# Patient Record
Sex: MALE | Race: WHITE | Employment: UNEMPLOYED | URBAN - METROPOLITAN AREA
[De-identification: names, ages, dates, MRNs, and addresses within clinical notes are randomized per-mention and may not be internally consistent; named-entity substitution may affect disease eponyms.]

---
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
## 2017-02-06 ENCOUNTER — TRANSFERRED RECORDS (OUTPATIENT)
Dept: HEALTH INFORMATION MANAGEMENT | Facility: CLINIC | Age: 22
End: 2017-02-06

## 2017-03-08 ENCOUNTER — TRANSFERRED RECORDS (OUTPATIENT)
Dept: HEALTH INFORMATION MANAGEMENT | Facility: CLINIC | Age: 22
End: 2017-03-08

## 2017-03-08 LAB
ALT SERPL-CCNC: 33 U/L (ref 12–78)
AST SERPL-CCNC: 18 U/L (ref 15–37)
CREAT SERPL-MCNC: 0.83 MG/DL (ref 0.7–1.3)
GFR SERPL CREATININE-BSD FRML MDRD: >90 ML/MIN/1.73M2
GLUCOSE SERPL-MCNC: 100 MG/DL (ref 70–100)
POTASSIUM SERPL-SCNC: 3.6 MEQ/L (ref 3.5–5.1)
TSH SERPL-ACNC: 0.63 UIU/ML (ref 0.36–3.74)

## 2017-06-26 ENCOUNTER — COMMUNICATION - HEALTHEAST (OUTPATIENT)
Dept: BEHAVIORAL HEALTH | Facility: CLINIC | Age: 22
End: 2017-06-26

## 2017-06-26 DIAGNOSIS — F11.20 OPIOID USE DISORDER, MODERATE, DEPENDENCE (H): ICD-10-CM

## 2017-08-04 ENCOUNTER — COMMUNICATION - HEALTHEAST (OUTPATIENT)
Dept: BEHAVIORAL HEALTH | Facility: CLINIC | Age: 22
End: 2017-08-04

## 2017-08-04 DIAGNOSIS — F90.2 ATTENTION DEFICIT HYPERACTIVITY DISORDER (ADHD), COMBINED TYPE: ICD-10-CM

## 2017-08-21 ENCOUNTER — TRANSFERRED RECORDS (OUTPATIENT)
Dept: HEALTH INFORMATION MANAGEMENT | Facility: CLINIC | Age: 22
End: 2017-08-21

## 2017-08-21 LAB
ALT SERPL-CCNC: 25 U/L (ref 12–78)
AST SERPL-CCNC: 15 U/L (ref 15–37)
CHOLEST SERPL-MCNC: 149 MG/DL (ref 0–200)
GLUCOSE SERPL-MCNC: 89 MG/DL (ref 70–99)
HDLC SERPL-MCNC: 43 MG/DL (ref 40–60)
LDLC SERPL CALC-MCNC: 90 MG/DL (ref 0–129)
TRIGL SERPL-MCNC: 79 MG/DL (ref 30–150)

## 2018-07-03 LAB — PHQ9 SCORE: 3

## 2019-03-06 LAB — PHQ9 SCORE: 2

## 2019-07-10 ENCOUNTER — TRANSFERRED RECORDS (OUTPATIENT)
Dept: HEALTH INFORMATION MANAGEMENT | Facility: CLINIC | Age: 24
End: 2019-07-10

## 2020-01-19 ENCOUNTER — OFFICE VISIT (OUTPATIENT)
Dept: URGENT CARE | Facility: URGENT CARE | Age: 25
End: 2020-01-19
Payer: COMMERCIAL

## 2020-01-19 VITALS
RESPIRATION RATE: 12 BRPM | HEART RATE: 86 BPM | HEIGHT: 72 IN | TEMPERATURE: 98.3 F | BODY MASS INDEX: 21.4 KG/M2 | OXYGEN SATURATION: 99 % | SYSTOLIC BLOOD PRESSURE: 110 MMHG | DIASTOLIC BLOOD PRESSURE: 68 MMHG | WEIGHT: 158 LBS

## 2020-01-19 DIAGNOSIS — R20.2 PARESTHESIA: Primary | ICD-10-CM

## 2020-01-19 DIAGNOSIS — M79.10 MYALGIA: ICD-10-CM

## 2020-01-19 LAB
ERYTHROCYTE [DISTWIDTH] IN BLOOD BY AUTOMATED COUNT: 13.6 % (ref 10–15)
HCT VFR BLD AUTO: 49.2 % (ref 40–53)
HGB BLD-MCNC: 16 G/DL (ref 13.3–17.7)
MCH RBC QN AUTO: 29.6 PG (ref 26.5–33)
MCHC RBC AUTO-ENTMCNC: 32.5 G/DL (ref 31.5–36.5)
MCV RBC AUTO: 91 FL (ref 78–100)
PLATELET # BLD AUTO: 265 10E9/L (ref 150–450)
RBC # BLD AUTO: 5.41 10E12/L (ref 4.4–5.9)
WBC # BLD AUTO: 8.4 10E9/L (ref 4–11)

## 2020-01-19 PROCEDURE — 85027 COMPLETE CBC AUTOMATED: CPT | Performed by: FAMILY MEDICINE

## 2020-01-19 PROCEDURE — 80053 COMPREHEN METABOLIC PANEL: CPT | Performed by: FAMILY MEDICINE

## 2020-01-19 PROCEDURE — 36415 COLL VENOUS BLD VENIPUNCTURE: CPT | Performed by: FAMILY MEDICINE

## 2020-01-19 PROCEDURE — 84443 ASSAY THYROID STIM HORMONE: CPT | Performed by: FAMILY MEDICINE

## 2020-01-19 PROCEDURE — 99203 OFFICE O/P NEW LOW 30 MIN: CPT | Performed by: FAMILY MEDICINE

## 2020-01-19 PROCEDURE — 83735 ASSAY OF MAGNESIUM: CPT | Performed by: FAMILY MEDICINE

## 2020-01-19 PROCEDURE — 82306 VITAMIN D 25 HYDROXY: CPT | Performed by: FAMILY MEDICINE

## 2020-01-19 RX ORDER — RISPERIDONE 1 MG/1
1 TABLET ORAL
COMMUNITY
Start: 2019-12-29 | End: 2020-02-18

## 2020-01-19 RX ORDER — AMOXICILLIN 875 MG
TABLET ORAL
COMMUNITY
Start: 2019-04-25 | End: 2020-01-19

## 2020-01-19 RX ORDER — NALTREXONE HYDROCHLORIDE 50 MG/1
TABLET, FILM COATED ORAL
COMMUNITY
Start: 2019-03-06 | End: 2020-02-18

## 2020-01-19 RX ORDER — IBUPROFEN 200 MG
800 TABLET ORAL EVERY 4 HOURS PRN
COMMUNITY
End: 2020-09-14

## 2020-01-19 ASSESSMENT — PAIN SCALES - GENERAL: PAINLEVEL: MODERATE PAIN (5)

## 2020-01-19 ASSESSMENT — MIFFLIN-ST. JEOR: SCORE: 1754.68

## 2020-01-19 NOTE — PATIENT INSTRUCTIONS
"Okay to take ibuprofen 200 mg - 4 tablets (800 mg) every 8 hours as needed.  Okay to take tylenol 500 mg - 2 tablets (1000 mg) every 6-8 hours as needed, do not exceed 3000 mg in 24 hours.    Patient Education     Paraesthesias  Paraesthesia is a burning or prickling sensation that is sometimes felt in the hands, arms, legs or feet. It can also occur in other parts of the body. It can also feel like tingling or numbness, skin crawling, or itching. The feeling is not comfortable, but it is not painful. (The \"pins and needles\" feeling that happens when a foot or hand \"falls asleep\" is a temporary paraesthesia.)  Paraesthesias that last or come and go may be caused by medical issues that need to be treated. These include stroke, a bulging disk pressing on a nerve, a trapped nerve, vitamin deficiencies, uncontrolled diabetes, alcohol abuse, or even certain medicines.  Tests are often done. These tests may include blood tests, X-ray, CT (computerized tomography) scan, nerve conduction studies (NCS), or a muscle test (electromyography). Depending on the cause, treatment may include physical therapy.  Home care    Tell your healthcare provider about all medicines you take. This includes prescription and over-the-counter medicines, vitamins, and herbs. Ask if any of the medicines may be causing your problems. Don't make any changes to prescription medicines without talking to your healthcare provider first.    You may be prescribed medicines to help relieve the tingling feeling or for pain. Take all medicines as directed.    A numb hand or foot may be more prone to injury. To help protect it:  ? Always use oven mitts.  ? Test water with an unaffected hand or foot.  ? Use caution when trimming nails. File sharp areas.  ? Wear shoes that fit well to avoid pressure points, blisters, and ulcers.  ? Inspect your hands and feet carefully (including the soles of your feet and between your toes) daily. If you see red areas, sores, " or other problems, tell your healthcare provider.  Follow-up care  Follow up with your doctor, or as advised. You may need further testing or evaluation.     When to seek medical advice  Call your healthcare provider right away if any of the following occur:    Numbness or weakness of the face, one arm, or one leg    Slurred speech, confusion, trouble speaking, walking, or seeing    Severe headache, fainting spell, dizziness, or seizure    Chest, arm, neck, or upper back pain    Loss of bladder or bowel control    Open wound with redness, swelling, or pus     Date Last Reviewed: 4/1/2018 2000-2019 [employer]. [address] [place] [id number]. All rights reserved. This information is not intended as a substitute for professional medical care. Always follow your healthcare professional's instructions.

## 2020-01-19 NOTE — PROGRESS NOTES
SUBJECTIVE:  Chief Complaint   Patient presents with     Urgent Care     Numbness     Having numb needle like sensations in hands, joints have been sore, thighs sore, shooting pains in back and chest, abdomen pains x 2d ago  Questions if it was related to a 4 day period of drinking heavy a week ago     New patient to [facility] Damon Desai is a 24 year old male who presents with a chief complaint of body ache, joint pain, and numbness in hands.  Patient states that he google his symptoms and got scared as search told him that he could have an STD or Aids.  Denies any dysuria or penile drip, states that is  and is not having unprotected sex with other partner.      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  Patient states that always used a new needle and never shared.  Patient wondering if alcohol could have caused the symptoms currently experiencing.  Last week had a drinking binge, drank hard liquor which is not his norm.  This was a 3 day episode to finish 3 bottles.  Denies any current abdominal pain, vomiting or nausea but did have GI symptoms before.    Denies any fever, sore throat, cough or congestion.  Denies any joint swelling or rash.      Family history - mother with COPD, negative for autoimmune disorder     No past medical history on file.  Current Outpatient Medications   Medication Sig Dispense Refill     ibuprofen (ADVIL/MOTRIN) 200 MG tablet Take 800 mg by mouth every 4 hours as needed for mild pain       naltrexone (DEPADE/REVIA) 50 MG tablet        risperiDONE (RISPERDAL) 1 MG tablet Take 1 mg by mouth       amoxicillin (AMOXIL) 875 MG tablet        Social History     Tobacco Use     Smoking status: Current Every Day Smoker     Smokeless tobacco: Never Used     Tobacco comment: Also uses a vape   Substance Use Topics     Alcohol use: Not on file       ROS:  Review of systems negative except as stated above.    EXAM:   BP  110/68 (BP Location: Right arm, Patient Position: Sitting, Cuff Size: Adult Regular)   Pulse 86   Temp 98.3  F (36.8  C) (Oral)   Resp 12   Ht 1.829 m (6')   Wt 71.7 kg (158 lb)   SpO2 99%   BMI 21.43 kg/m    GENERAL APPEARANCE: healthy, alert and no distress  CHEST: clear to auscultation  CV: regular rate and rhythm  EXTREMITIES: peripheral pulses normal  SKIN: no suspicious lesions or rashes  PSYCH: alert, affect bright    Results for orders placed or performed in visit on 01/19/20   CBC with platelets     Status: None   Result Value Ref Range    WBC 8.4 4.0 - 11.0 10e9/L    RBC Count 5.41 4.4 - 5.9 10e12/L    Hemoglobin 16.0 13.3 - 17.7 g/dL    Hematocrit 49.2 40.0 - 53.0 %    MCV 91 78 - 100 fl    MCH 29.6 26.5 - 33.0 pg    MCHC 32.5 31.5 - 36.5 g/dL    RDW 13.6 10.0 - 15.0 %    Platelet Count 265 150 - 450 10e9/L         ASSESSMENT/PLAN:  (R20.2) Paresthesia  (primary encounter diagnosis)  Plan: CBC with platelets, Comprehensive metabolic         panel (BMP + Alb, Alk Phos, ALT, AST, Total.         Bili, TP), TSH with free T4 reflex, Magnesium            (M79.10) Myalgia  Plan: CBC with platelets, Comprehensive metabolic         panel (BMP + Alb, Alk Phos, ALT, AST, Total.         Bili, TP), TSH with free T4 reflex, Magnesium,         Vitamin D Deficiency            Reassurance given, reviewed that joint pain and body ache most likely either musculoskeletal etiology vs viral.  Due to presentation with paresthesia symptoms, will obtain electrolyte, magnesium, and vit d screen.  Encourage symptomatic treatment with tylenol and ibuprofen, rest, ice or heat.  Reviewed paresthesia concerns and discussed that this may be more nerve related, will most likely require further evaluation and encourage to establish with primary provider for follow up.    Discussed concerns for STD symptoms presentation and unlikely that would develop any STD or HIV symptoms, reviewed how STD are contracted.  Offered to obtain HIV,  RPR, Hep C, gonorrhea and chlamydia screen but patient declined today after discussion, will follow up with primary provider     Patient to establish with primary provider and follow up with within 1 week    Mesfin Perez MD

## 2020-01-20 LAB
ALBUMIN SERPL-MCNC: 4.2 G/DL (ref 3.4–5)
ALP SERPL-CCNC: 86 U/L (ref 40–150)
ALT SERPL W P-5'-P-CCNC: 29 U/L (ref 0–70)
ANION GAP SERPL CALCULATED.3IONS-SCNC: 6 MMOL/L (ref 3–14)
AST SERPL W P-5'-P-CCNC: 17 U/L (ref 0–45)
BILIRUB SERPL-MCNC: 0.4 MG/DL (ref 0.2–1.3)
BUN SERPL-MCNC: 7 MG/DL (ref 7–30)
CALCIUM SERPL-MCNC: 9.1 MG/DL (ref 8.5–10.1)
CHLORIDE SERPL-SCNC: 107 MMOL/L (ref 94–109)
CO2 SERPL-SCNC: 27 MMOL/L (ref 20–32)
CREAT SERPL-MCNC: 0.82 MG/DL (ref 0.66–1.25)
DEPRECATED CALCIDIOL+CALCIFEROL SERPL-MC: 10 UG/L (ref 20–75)
GFR SERPL CREATININE-BSD FRML MDRD: >90 ML/MIN/{1.73_M2}
GLUCOSE SERPL-MCNC: 85 MG/DL (ref 70–99)
MAGNESIUM SERPL-MCNC: 2.4 MG/DL (ref 1.6–2.3)
POTASSIUM SERPL-SCNC: 4.6 MMOL/L (ref 3.4–5.3)
PROT SERPL-MCNC: 7.6 G/DL (ref 6.8–8.8)
SODIUM SERPL-SCNC: 140 MMOL/L (ref 133–144)
TSH SERPL DL<=0.005 MIU/L-ACNC: 1.5 MU/L (ref 0.4–4)

## 2020-01-21 ENCOUNTER — TELEPHONE (OUTPATIENT)
Dept: URGENT CARE | Facility: URGENT CARE | Age: 25
End: 2020-01-21

## 2020-01-22 NOTE — TELEPHONE ENCOUNTER
Called number on file for pt which was [phone], a woman answered this phone and stated she keeps getting calls from Maxwell and that is not his number.  I called Maxwell's Father Kevin who said Maxwell was there and put him on the phone.  I confirmed Maxwell's phone number and he said it was [phone].  I updated this in his demographics as well.    I relayed message from provider regarding vitamin D levels and pt starting to take Vitamin D3 5000 international unit(s).  Pt understood, and also understood to make a follow up appointment w/PMD to recheck levels in 4-6 wks and to make appointment sooner if symptoms worsen.    Chris Resendiz, CMA

## 2020-01-22 NOTE — TELEPHONE ENCOUNTER
----- Message from Zhanna Ignacio PA-C sent at 1/21/2020  7:03 PM CST -----  Please let patient know Vit D is low. Advised taking vit D3 5000 international unit(s) over the counter one tablet daily. Recheck with PCP in 4-6 weeks. Follow up sooner if any worsening symptoms. Kidney enzymes, liver enzymes, thyroid, magnesium are normal.    Thanks   Zhanna

## 2020-01-31 ENCOUNTER — OFFICE VISIT (OUTPATIENT)
Dept: URGENT CARE | Facility: URGENT CARE | Age: 25
End: 2020-01-31
Payer: COMMERCIAL

## 2020-01-31 VITALS
HEART RATE: 86 BPM | SYSTOLIC BLOOD PRESSURE: 124 MMHG | OXYGEN SATURATION: 99 % | DIASTOLIC BLOOD PRESSURE: 82 MMHG | TEMPERATURE: 96.9 F

## 2020-01-31 DIAGNOSIS — H60.391 ACUTE INFECTIVE OTITIS EXTERNA OF RIGHT EAR: Primary | ICD-10-CM

## 2020-01-31 PROCEDURE — 99213 OFFICE O/P EST LOW 20 MIN: CPT | Performed by: FAMILY MEDICINE

## 2020-01-31 RX ORDER — NEOMYCIN SULFATE, POLYMYXIN B SULFATE, HYDROCORTISONE 3.5; 10000; 1 MG/ML; [USP'U]/ML; MG/ML
3 SOLUTION/ DROPS AURICULAR (OTIC) 4 TIMES DAILY
Qty: 5 ML | Refills: 0 | Status: SHIPPED | OUTPATIENT
Start: 2020-01-31 | End: 2020-02-18

## 2020-01-31 NOTE — PATIENT INSTRUCTIONS
Tylenol, ibuprofen for the right ear pain.     Avoid getting water in the right ear if possible for the next week.     follow up if not better in 7-10 days.       Patient Education     External Ear Infection (Adult)    External otitis (also called  swimmer s ear ) is an infection in the ear canal. It is often caused by bacteria or fungus. It can occur a few days after water gets trapped in the ear canal (from swimming or bathing). It can also occur after cleaning too deeply in the ear canal with a cotton swab or other object. Sometimes, hair care products get into the ear canal and cause this problem.  Symptoms can include pain, fever, itching, redness, drainage, or swelling of the ear canal. Temporary hearing loss may also occur.  Home care    Do not try to clean the ear canal. This can push pus and bacteria deeper into the canal.    Use prescribed ear drops as directed. These help reduce swelling and fight the infection. If an ear wick was placed in the ear canal, apply drops right onto the end of the wick. The wick will draw the medicine into the ear canal even if it is swollen closed.    A cotton ball may be loosely placed in the outer ear to absorb any drainage.    You may use acetaminophen or ibuprofen to control pain, unless another medicine was prescribed. Note: If you have chronic liver or kidney disease or ever had a stomach ulcer or GI bleeding, talk to your healthcare provider before taking any of these medicines.    Do not allow water to get into your ear when bathing. Also, don't swim until the infection has cleared.  Prevention    Keep your ears dry. This helps lower the risk of infection. Dry your ears with a towel or hair dryer after getting wet. Also, use ear plugs when swimming.    Do not stick any objects in the ear to remove wax.    If you feel water trapped in your ear, use ear drops right away. You can get these drops over the counter at most drugstores. They work by removing water from the  ear canal.  Follow-up care  Follow up with your healthcare provider in 1 week, or as advised.  When to seek medical advice  Call your healthcare provider right away if any of these occur:    Ear pain becomes worse or doesn t improve after 3 days of treatment    Redness or swelling of the outer ear occurs or gets worse    Headache    Painful or stiff neck    Drowsiness or confusion    Fever of 100.4 F (38 C) or higher, or as directed by your healthcare provider    Seizure  Date Last Reviewed: 10/1/2017    [id number] [employer]. [address] [place] [id number]. All rights reserved. This information is not intended as a substitute for professional medical care. Always follow your healthcare professional's instructions.

## 2020-01-31 NOTE — PROGRESS NOTES
SUBJECTIVE:   Maxwell Desai is a 24 year old male presenting with a chief complaint of right ear pressure with pain radiating down the right jaw and up to the right temple.  The pain is not worse with chewing/opening/closing the jaw.  .Onset of symptoms was one week ago.  No decreased hearing.    Course of illness is worsening.    Severity severe pain (pain rating:  10 out of 10).    Current and Associated symptoms: as listed above.   Treatment measures tried include over the counter ear drops (cerumen removers) without any relief...    Past Medical History:  Insomnia    Current Outpatient Medications   Medication Sig Dispense Refill     naltrexone (DEPADE/REVIA) 50 MG tablet        risperiDONE (RISPERDAL) 1 MG tablet Take 1 mg by mouth       ibuprofen (ADVIL/MOTRIN) 200 MG tablet Take 800 mg by mouth every 4 hours as needed for mild pain       Social History     Tobacco Use     Smoking status: Current Every Day Smoker     Smokeless tobacco: Never Used     Tobacco comment: Also uses a vape   Substance Use Topics     Alcohol use: Not on file       ROS:  CONSTITUTIONAL:NEGATIVE for fever, chills, change in weight  ENT/MOUTH: positive for right ear pain.     OBJECTIVE:  /82   Pulse 86   Temp 96.9  F (36.1  C) (Tympanic)   SpO2 99%   GENERAL APPEARANCE: healthy, alert and no distress  HENT: there is pain with movement of the right auricle.  The right ear canal is erythematous, edematous and tender.  The right TM is gray without bulging nor effusions.  The left TM and left canal are within normal limits.     ASSESSMENT:  Otitis Externa of the right ear.      PLAN:  Rx:  Cortisporin otic solution.   follow up if not better in 7-10 days.   Tylenol, ibuprofen for the ear pain.     Oscar Martines MD

## 2020-02-18 ENCOUNTER — OFFICE VISIT (OUTPATIENT)
Dept: FAMILY MEDICINE | Facility: CLINIC | Age: 25
End: 2020-02-18
Payer: COMMERCIAL

## 2020-02-18 VITALS
HEART RATE: 77 BPM | DIASTOLIC BLOOD PRESSURE: 68 MMHG | BODY MASS INDEX: 21.43 KG/M2 | TEMPERATURE: 97.8 F | WEIGHT: 158 LBS | OXYGEN SATURATION: 100 % | SYSTOLIC BLOOD PRESSURE: 106 MMHG

## 2020-02-18 DIAGNOSIS — F11.11 HX OF OPIOID ABUSE (H): ICD-10-CM

## 2020-02-18 DIAGNOSIS — F39 MOOD DISORDER (H): Primary | ICD-10-CM

## 2020-02-18 DIAGNOSIS — Z23 NEED FOR TDAP VACCINATION: ICD-10-CM

## 2020-02-18 DIAGNOSIS — E55.9 VITAMIN D DEFICIENCY: ICD-10-CM

## 2020-02-18 PROCEDURE — 99214 OFFICE O/P EST MOD 30 MIN: CPT | Mod: 25 | Performed by: PHYSICIAN ASSISTANT

## 2020-02-18 PROCEDURE — 90715 TDAP VACCINE 7 YRS/> IM: CPT | Performed by: PHYSICIAN ASSISTANT

## 2020-02-18 PROCEDURE — 90471 IMMUNIZATION ADMIN: CPT | Performed by: PHYSICIAN ASSISTANT

## 2020-02-18 RX ORDER — RISPERIDONE 1 MG/1
1 TABLET ORAL AT BEDTIME
Qty: 90 TABLET | Refills: 1 | Status: SHIPPED | OUTPATIENT
Start: 2020-02-18 | End: 2020-09-13

## 2020-02-18 RX ORDER — NALTREXONE HYDROCHLORIDE 50 MG/1
50 TABLET, FILM COATED ORAL DAILY
Qty: 90 TABLET | Refills: 1 | Status: SHIPPED | OUTPATIENT
Start: 2020-02-18

## 2020-02-18 ASSESSMENT — ANXIETY QUESTIONNAIRES
7. FEELING AFRAID AS IF SOMETHING AWFUL MIGHT HAPPEN: NOT AT ALL
4. TROUBLE RELAXING: SEVERAL DAYS
5. BEING SO RESTLESS THAT IT IS HARD TO SIT STILL: NOT AT ALL
1. FEELING NERVOUS, ANXIOUS, OR ON EDGE: NOT AT ALL
2. NOT BEING ABLE TO STOP OR CONTROL WORRYING: NOT AT ALL
7. FEELING AFRAID AS IF SOMETHING AWFUL MIGHT HAPPEN: NOT AT ALL
6. BECOMING EASILY ANNOYED OR IRRITABLE: NOT AT ALL
GAD7 TOTAL SCORE: 1
GAD7 TOTAL SCORE: 1
3. WORRYING TOO MUCH ABOUT DIFFERENT THINGS: NOT AT ALL
GAD7 TOTAL SCORE: 1

## 2020-02-18 ASSESSMENT — PATIENT HEALTH QUESTIONNAIRE - PHQ9
SUM OF ALL RESPONSES TO PHQ QUESTIONS 1-9: 5
SUM OF ALL RESPONSES TO PHQ QUESTIONS 1-9: 5
10. IF YOU CHECKED OFF ANY PROBLEMS, HOW DIFFICULT HAVE THESE PROBLEMS MADE IT FOR YOU TO DO YOUR WORK, TAKE CARE OF THINGS AT HOME, OR GET ALONG WITH OTHER PEOPLE: SOMEWHAT DIFFICULT

## 2020-02-18 NOTE — PROGRESS NOTES
"Wilbert Desai is a 24 year old male who presents to clinic today for the following health issues:    History of Present Illness        He eats 2-3 servings of fruits and vegetables daily.He consumes 7 sweetened beverage(s) daily.He exercises with enough effort to increase his heart rate 10 to 19 minutes per day.  He exercises with enough effort to increase his heart rate 4 days per week.   He is taking medications regularly.     New Patient/Transfer of Care    Medication Followup of Risperdal    Taking Medication as prescribed: yes    Side Effects:  None    Medication Helping Symptoms:  yes       Patient recently moved with wife and 2 year old daughter to [place] and is looking to establish care with a pcp and psychiatrist. He states he has a history of a \"mood disorder\" but does not specify if true depression, anxiety, bipolar, etc. He states he suffers from mood swings at times and risperdal manages this well without side effects. On for years. No side effects.     He also takes natrexone daily prn when cravings for opioids start. History of opioid abuse and is currently 3 years sober.     Patient was seen in  for paresthesias ~1 month ago and work up was negative with exception of vitamin d deficiency. He states has been taking 5000 international unit(s) of d3 otc daily and symptoms have resolved.     Patient Active Problem List   Diagnosis     Mood disorder (H)     Hx of opioid abuse (H)     Vitamin D deficiency     History reviewed. No pertinent surgical history.    Social History     Tobacco Use     Smoking status: Current Every Day Smoker     Smokeless tobacco: Never Used     Tobacco comment: Also uses a vape   Substance Use Topics     Alcohol use: Not on file     Comment: stopped as of 1/1/2020     Family History   Problem Relation Age of Onset     Bronchitis Mother          Current Outpatient Medications   Medication Sig Dispense Refill     naltrexone (DEPADE/REVIA) 50 MG tablet Take 1 " tablet (50 mg) by mouth daily 90 tablet 1     risperiDONE (RISPERDAL) 1 MG tablet Take 1 tablet (1 mg) by mouth At Bedtime 90 tablet 1     ibuprofen (ADVIL/MOTRIN) 200 MG tablet Take 800 mg by mouth every 4 hours as needed for mild pain       No Known Allergies  Recent Labs   Lab Test 01/19/20  1712 08/21/17 03/08/17 05/30/16   LDL  --  90  --   --  83   HDL  --  43  --   --  43   TRIG  --  79  --   --  81   ALT 29 25 33   < >  --    CR 0.82  --  0.83   < >  --    GFRESTIMATED >90  --  >90   < >  --    GFRESTBLACK >90  --  >90   < >  --    POTASSIUM 4.6  --  3.6   < >  --    TSH 1.50  --  0.63   < >  --     < > = values in this interval not displayed.      BP Readings from Last 3 Encounters:   02/18/20 106/68   01/31/20 124/82   01/19/20 110/68    Wt Readings from Last 3 Encounters:   02/18/20 71.7 kg (158 lb)   01/19/20 71.7 kg (158 lb)                    Reviewed and updated as needed this visit by Provider  Tobacco  Allergies  Meds  Problems  Med Hx  Surg Hx  Fam Hx         Review of Systems   ROS COMP: Constitutional, neuro, psych, HEENT, cardiovascular, pulmonary, gi and gu systems are negative, except as otherwise noted.      Objective    /68 (BP Location: Right arm, Patient Position: Chair, Cuff Size: Adult Regular)   Pulse 77   Temp 97.8  F (36.6  C) (Oral)   Wt 71.7 kg (158 lb)   SpO2 100%   BMI 21.43 kg/m    Body mass index is 21.43 kg/m .  Physical Exam   GENERAL: healthy, alert and no distress  RESP: lungs clear to auscultation - no rales, rhonchi or wheezes  CV: regular rates and rhythm, normal S1 S2, no S3 or S4 and no murmur, click or rub  NEURO: Normal strength and tone, mentation intact and speech normal  PSYCH: mentation appears normal, affect normal/bright    Diagnostic Test Results:  none         Assessment & Plan     (F39) Mood disorder (H)  (primary encounter diagnosis)  Comment: stable on current regimen. Will refill due to need. However, recommending establish with long  term psych if regimen modifications are needed in the future. Adjustments to risperidone is out of the scope of my practice. Patient agrees. Referral given for psych options and patient interest in marriage counseling as well.   Plan: Lipid panel reflex to direct LDL Fasting,         risperiDONE (RISPERDAL) 1 MG tablet, MENTAL         HEALTH REFERRAL  - Adult; Psychiatry and         Medication Management, Outpatient Treatment;         Individual/Couples/Family/Group Therapy/Health         Psychology; G: [facility]         [phone]; We will contact you to schedule        the appointmen...            (F11.11) Hx of opioid abuse (H)  Comment: sober. Refilled. Labs updated at .   Plan: naltrexone (DEPADE/REVIA) 50 MG tablet        -Medication use and side effects discussed with the patient. Patient is in complete understanding and agreement with plan.       (E55.9) Vitamin D deficiency  Comment: will recheck with lipids in ~1 month lab only fasting.  Plan: **Vitamin D Deficiency FUTURE anytime            (Z23) Need for Tdap vaccination  Comment:   Plan: TDAP, IM (10 - 64 YRS) - Adacel               Tobacco Cessation:   reports that he has been smoking. He has never used smokeless tobacco.  Tobacco Cessation Action Plan: Self help information given to patient        Return in about 1 month (around 3/18/2020) for Lab Work-fasting lab only .    Jesus Bee PA-C  [facility]    Answers for HPI/ROS submitted by the patient on 2/18/2020   Chronic problems general questions HPI Form  If you checked off any problems, how difficult have these problems made it for you to do your work, take care of things at home, or get along with other people?: Somewhat difficult  PHQ9 TOTAL SCORE: 5  ALISHA 7 TOTAL SCORE: 1

## 2020-02-19 ASSESSMENT — PATIENT HEALTH QUESTIONNAIRE - PHQ9: SUM OF ALL RESPONSES TO PHQ QUESTIONS 1-9: 5

## 2020-02-19 ASSESSMENT — ANXIETY QUESTIONNAIRES: GAD7 TOTAL SCORE: 1

## 2020-03-14 ENCOUNTER — VIRTUAL VISIT (OUTPATIENT)
Dept: FAMILY MEDICINE | Facility: OTHER | Age: 25
End: 2020-03-14

## 2020-03-15 NOTE — PROGRESS NOTES
"Date: 2020 18:55:47  Clinician: Estephania Ramos  Clinician NPI: [id number]  Patient: Maxwell Desai  Patient : 1995  Patient Address: [address] [place], [place] [address]  Patient Phone: [phone]  Visit Protocol: URI  Patient Summary:  Maxwell is a 24 year old ( : 1995 ) male who initiated a Visit for COVID-19 (Coronavirus) evaluation and screening. When asked the question \"Please sign me up to receive news, health information and promotions from [employer].\", Maxwell responded \"No\".    Maxwell states his symptoms started 1-2 days ago.   His symptoms consist of ear pain, malaise, and myalgia. He is experiencing mild difficulty breathing with activities but can speak normally in full sentences. Maxwell also feels feverish.   Symptom details   Temperature: His current temperature is 97.2 degrees Fahrenheit.    Maxwell denies having rhinitis, facial pain or pressure, chills, wheezing, sore throat, cough, nasal congestion, teeth pain, and headache. He also denies taking antibiotic medication for the symptoms and having recent facial or sinus surgery in the past 60 days.   Precipitating events  He has not recently been exposed to someone with influenza. Maxwell has been in close contact with the following high risk individuals: people with asthma, heart disease or diabetes, immunocompromised people, adults 65 or older, and children under the age of 5.   Pertinent COVID-19 (Coronavirus) information  Maxwell has not traveled internationally or to the areas where COVID-19 (Coronavirus) is widespread in the last 14 days before the start of his symptoms.   Maxwell has not had close contact with a suspected or laboratory-confirmed COVID-19 patient within 14 days of symptom onset.   Maxwell is not a healthcare worker and does not work in a healthcare facility.   Pertinent medical history  Maxwell does not need a return to work/school note.   Weight: 156 lbs   Maxwell smokes or uses smokeless tobacco.   Weight: 156 lbs    MEDICATIONS: No current " medications, ALLERGIES: NKDA  Clinician Response:  Dear Maxwell,  Based on the information provided, you have a viral upper respiratory infection, otherwise known as a cold. Symptoms vary from person to person, but can include sneezing, coughing, a runny nose, sore throat, and headache and range from mild to severe.  Unfortunately, there are no medications that can cure a cold, so treatment is focused on controlling symptoms as much as possible. Most people gradually feel better until symptoms are gone in 1-2 weeks.  Medication information  Because you have a viral infection, antibiotics will not help you get better. Treating a viral infection with antibiotics could actually make you feel worse.  Unless you are allergic to the over-the-counter medication(s) below, I recommend using:       Acetaminophen (Tylenol or store brand) oral tablet. Take 1-2 tablets by mouth every 4-6 hours to help with the discomfort.      Ibuprofen (Advil or store brand) 200 mg oral tablet. Take 1-3 tablets (200-600 mg) by mouth every 8 hours to help with the discomfort. Make sure to take the ibuprofen with food. Do not exceed 2400 mg in 24 hours.      Oxymetazoline (Afrin or store brand) nasal spray. Use 1 spray in each nostril 2 times a day for a maximum of 3 days. Using this medication more frequently or longer than recommended may cause nasal congestion to reoccur or worsen. Sinus pressure occurs when the tissues lining your sinuses become swollen and inflamed. Afrin nasal spray decreases the swelling to provide the quickest and most effective relief from sinus pressure.      Over-the-counter medications do not require a prescription. Ask the pharmacist if you have any questions.  Self care  The following tips will keep you as comfortable as possible while you recover:     Rest    Drink plenty of water and other liquids     Also, as your provider, I need you to know that becoming tobacco-free is the most important thing you can do to  protect your current and future health.  When to seek care  Please be seen in a clinic or urgent care if new symptoms develop, or symptoms become worse.  Additional treatment plan   Dear Maxwell,  Based on the information you have provided, it does not appear you need Coronavirus (COVID-19) testing.   At this time, we recommend testing primarily for those people who have symptoms of cough and fever and have either traveled to a known area of infection or have been exposed to someone with laboratory confirmed Coronavirus by close contact.   Coronavirus - General Information:   The coronavirus infection starts within 14 days of an exposure.  Symptoms are those of a respiratory infection (such as fever, cough).   If you have not had symptoms by day 15, you should be considered uninfected by coronavirus.   Coronavirus - Symptoms:    The coronavirus can cause a respiratory illness, such as bronchitis or pneumonia.  The most common symptoms are: cough, fever, and shortness of breath.   Other symptoms are: body aches, chills, diarrhea, fatigue, headache, runny nose, and sore throat   Coronavirus - Exposure Risk Factors:   Exposure to a person who has been diagnosed with coronavirus.  Travel from an area with recent local transmission of coronavirus.  The CDC (www.cdc.gov) has the most up-to-date list of where the coronavirus outbreak is occurring.   Coronavirus - Spreading:    The virus likely spreads through respiratory droplets produced when a person coughs or sneezes. These respiratory droplets can travel approximately 6 feet and can remain on surfaces. Common disinfectants will kill the virus.  The CDC currently does not recommend healthy people wear masks.   Coronavirus - Protect Yourself:    Avoid close contact with people known to have this new coronavirus infection.  Wash hands often with soap and water or alcohol-based hand .  Avoid touching the eyes, nose or mouth.   Thank you for limiting contact with  others, wearing a simple mask to cover your cough, practice good hand hygiene habits and accessing our virtual services where possible to limit the spread of this virus.  For more information about COVID19 and options for caring for yourself at home, please visit the CDC website at https://www.cdc.gov/coronavirus/2019-ncov/about/steps-when-sick.html   For more options for care at [facility], please visit our website at [email or web address]     Diagnosis: Acute upper respiratory infection, unspecified  Diagnosis ICD: J06.9  Additional Clinician Notes: If your ear pain isn't better in 3 days, please be seen in person for recheck.

## 2020-03-17 ENCOUNTER — VIRTUAL VISIT (OUTPATIENT)
Dept: FAMILY MEDICINE | Facility: OTHER | Age: 25
End: 2020-03-17

## 2020-03-17 NOTE — PROGRESS NOTES
"Date: 2020 23:23:49  Clinician: JORGE Nunn  Clinician NPI: [id number]  Patient: Maxwell Desai  Patient : 1995  Patient Address: [address] [place] [address]  Patient Phone: [phone]  Visit Protocol: URI  Patient Summary:  Maxwell is a 24 year old ( : 1995 ) male who initiated a Visit for COVID-19 (Coronavirus) evaluation and screening. When asked the question \"Please sign me up to receive news, health information and promotions from [employer].\", Maxwell responded \"No\".    Maxwell states his symptoms started gradually 3-6 days ago.   His symptoms consist of ear pain, malaise, enlarged lymph nodes, facial pain or pressure, and myalgia.   Symptom details   Facial pain or pressure: The facial pain or pressure does not feel worse when bending or leaning forward.    Maxwell denies having wheezing, sore throat, cough, nasal congestion, teeth pain, fever, chills, headache, and rhinitis. He also denies taking antibiotic medication for the symptoms, having recent facial or sinus surgery in the past 60 days, and double sickening (worsening symptoms after initial improvement).   Precipitating events  He has not recently been exposed to someone with influenza. Maxwell has been in close contact with the following high risk individuals: immunocompromised people, adults 65 or older, children under the age of 5, and people with asthma, heart disease or diabetes.   Pertinent COVID-19 (Coronavirus) information  Maxwell has not traveled internationally or to the areas where COVID-19 (Coronavirus) is widespread in the last 14 days before the start of his symptoms.   Maxwell has not had a close contact with a laboratory-confirmed COVID-19 patient within 14 days of symptom onset. He also has not had a close contact with a suspected COVID-19 patient within 14 days of symptom onset.   Maxwell is not a healthcare worker and does not work in a healthcare facility.   Triage Point(s) temporarily suspended for COVID-19 " (Coronavirus) screening  Maxwell reported the following symptoms which were previously protocol referral points. These protocol referral points have temporarily been removed for purposes of COVID-19 (Coronavirus) screening.   Difficulty breathing even when resting and can only speak in phrase(s)   Pertinent medical history  Maxwell does not need a return to work/school note.   Weight: 156 lbs   Maxwell smokes or uses smokeless tobacco.   Weight: 156 lbs    MEDICATIONS: No current medications, ALLERGIES: NKDA  Clinician Response:  Dear Maxwell,  Based on the information provided, you have viral sinusitis, also known as a sinus infection. Sinus infections are caused by bacteria or a virus and symptoms are almost always identical. The difference between the 2 types of infections is timing.  Sinus infections start as viral infections and symptoms improve on their own in about 7 days. If symptoms have not improved after 7 days or have even worsened, a bacterial infection may have developed.  Medication information  Because you have a viral infection, antibiotics will not help you get better. Treating a viral infection with antibiotics could actually make you feel worse.  Unless you are allergic to the over-the-counter medication(s) below, I recommend using:       Acetaminophen (Tylenol or store brand) oral tablet. Take 1-2 tablets by mouth every 4-6 hours to help with the discomfort.      Ibuprofen (Advil or store brand) 200 mg oral tablet. Take 1-3 tablets (200-600 mg) by mouth every 8 hours to help with the discomfort. Make sure to take the ibuprofen with food. Do not exceed 2400 mg in 24 hours.    An antihistamine such as Benadryl, Claritin, or store brand.    A decongestant such as Sudafed PE or store brand.      Saline nasal spray (Camp or store brand). Use 1-2 sprays in each nostril 3 times a day as needed for congestion.    A sinus irrigation kit such as Sinus Rinse, Neti Pot, SinuCleanse, or store brand. Be sure to use sterile  or previously boiled water to prevent unwanted infections.      Oxymetazoline (Afrin or store brand) nasal spray. Use 1 spray in each nostril 2 times a day for a maximum of 3 days. Using this medication more frequently or longer than recommended may cause nasal congestion to reoccur or worsen. Sinus pressure occurs when the tissues lining your sinuses become swollen and inflamed. Afrin nasal spray decreases the swelling to provide the quickest and most effective relief from sinus pressure.      Over-the-counter medications do not require a prescription. Ask the pharmacist if you have any questions.  Self care  The following tips will keep you as comfortable as possible while you recover:     Rest    Drink plenty of water and other liquids     Also, as your provider, I need you to know that becoming tobacco-free is the most important thing you can do to protect your current and future health.  When to seek care  Please be seen in a clinic or urgent care if new symptoms develop, or symptoms become worse.  COVID-19 (Coronavirus) General Information  With the increase in the number of COVID-19 (Coronavirus) cases, we understand you may have some questions. Below is some helpful information on COVID-19 (Coronavirus).  How can I protect myself and others from the COVID-19 (Coronavirus)?  Because there is currently no vaccine to prevent infection, the best way to protect yourself is to avoid being exposed to this virus. Put distance between yourself and other people if COVID-19 (Coronavirus) is spreading in your community. The virus is thought to spread mainly from person-to-person.     Between people who are in close contact with one another (within about 6 about) for prolonged period (10 minutes or longer).    Through respiratory droplets produced when an infected person coughs or sneezes.     The CDC recommends the following additional steps to protect yourself and others:     Wash your hands often with soap and water  for at least 20 seconds, especially after blowing your nose, coughing, or sneezing; going to the bathroom; and before eating or preparing food.  Use an alcohol-based hand  that contains at least 60 percent alcohol if soap and water are not available.        Avoid touching your eyes, nose and mouth with unwashed hands.    Avoid close contact with people who are sick.    Stay home when you are sick.    Cover your cough or sneeze with a tissue, then throw the tissue in the trash.    Clean and disinfect frequently touched objects and surfaces.     You can help stop COVID-19 (Coronavirus) by knowing the signs and symptoms:     Fever    Cough    Shortness of breath     Contact your healthcare provider if   Develop symptoms   AND   Have been in close contact with a person known to have COVID-19 (Coronavirus) or live in or have recently traveled from an area with ongoing spread of COVID-19 (Coronavirus). Call ahead before you go to a doctor's office or emergency room. Tell them about your recent travel and your symptoms.   For the most up to date information, visit the CDC's website.  Steps to help prevent the spread of COVID-19 (Coronavirus) if you are sick  If you are sick with COVID-19 (Coronavirus) or suspect you are infected with the virus that causes COVID-19 (Coronavirus), follow the steps below to help prevent the disease from spreading&nbsp;to people in your home and community.     Stay home except to get medical care. Home isolation may be started in consultation with your healthcare clinician.    Separate yourself from other people and animals in your home.    Call ahead before visiting your doctor if you have a medical appointment.    Wear a facemask when you are around other people.    Cover your cough and sneezes.    Clean your hands often.    Avoid sharing personal household items.    Clean and disinfect frequently touched objects and surfaces everyday.    You will need to have someone drop off  "medications or household supplies (if needed) at your house without coming inside or in contact with you or others living in your house.    Monitor your symptoms and seek prompt medical care if your illness is worsening (e.g. Difficulty breathing).    Discontinue home isolation only in consultation with your healthcare provider.     For more detailed and up to date information on what to do if you are sick, visit this link: What to Do If You Are Sick With Coronavirus Disease 2019 (COVID-19).  Do I need to be tested for COVID-19 (Coronavirus)?     At this time, the limited number of tests available are controlled by the state and local health departments and are being reserved for more seriously ill patients, those with known exposure to confirmed patients, and those with recent travel (within 14 days) to countries with high rates of COVID-19 (Coronavirus).    Decisions on which patients receive testing will be based on the local spread of COVID-19 (Coronavirus) as well as the symptoms. Your healthcare provider will make the final decision on whether you should be tested.    In the meantime, if you have concerns that you may have been exposed, it is reasonable to practice \"social distancing.\"&nbsp; If you are ill with a cold or flu-like illness, please monitor your symptoms and reach out to your healthcare provider if your symptoms worsen.    For more up to date information, visit this link: COVID-19 (Coronavirus) Frequently Asked Questions and Answers.     I am providing you with a promo code for a free Visit (redeemable at this website only). This code will  in two weeks and may only be used once. Please redeem your free Visit by entering the following promo code on the payment screen: [id number]   Diagnosis: Viral sinusitis  Diagnosis ICD: J01.90  "

## 2020-09-10 DIAGNOSIS — F39 MOOD DISORDER (H): ICD-10-CM

## 2020-09-11 NOTE — TELEPHONE ENCOUNTER
Routing refill request to provider for review/approval because:  Labs not current:  Lipid panel    Patient has appt scheduled 9/14/20    Irene IZQUIERDO RN, BSN

## 2020-09-13 RX ORDER — RISPERIDONE 1 MG/1
TABLET ORAL
Qty: 90 TABLET | Refills: 0 | Status: SHIPPED | OUTPATIENT
Start: 2020-09-13

## 2020-09-14 ENCOUNTER — VIRTUAL VISIT (OUTPATIENT)
Dept: FAMILY MEDICINE | Facility: CLINIC | Age: 25
End: 2020-09-14
Payer: COMMERCIAL

## 2020-09-14 DIAGNOSIS — F39 MOOD DISORDER (H): ICD-10-CM

## 2020-09-14 PROCEDURE — 99213 OFFICE O/P EST LOW 20 MIN: CPT | Mod: 95 | Performed by: PHYSICIAN ASSISTANT

## 2020-09-14 RX ORDER — NAPROXEN SODIUM 220 MG
220 TABLET ORAL 2 TIMES DAILY WITH MEALS
COMMUNITY
Start: 2020-09-14

## 2020-09-14 ASSESSMENT — ANXIETY QUESTIONNAIRES
7. FEELING AFRAID AS IF SOMETHING AWFUL MIGHT HAPPEN: NOT AT ALL
3. WORRYING TOO MUCH ABOUT DIFFERENT THINGS: NOT AT ALL
GAD7 TOTAL SCORE: 0
5. BEING SO RESTLESS THAT IT IS HARD TO SIT STILL: NOT AT ALL
1. FEELING NERVOUS, ANXIOUS, OR ON EDGE: NOT AT ALL
2. NOT BEING ABLE TO STOP OR CONTROL WORRYING: NOT AT ALL
6. BECOMING EASILY ANNOYED OR IRRITABLE: NOT AT ALL
IF YOU CHECKED OFF ANY PROBLEMS ON THIS QUESTIONNAIRE, HOW DIFFICULT HAVE THESE PROBLEMS MADE IT FOR YOU TO DO YOUR WORK, TAKE CARE OF THINGS AT HOME, OR GET ALONG WITH OTHER PEOPLE: NOT DIFFICULT AT ALL

## 2020-09-14 ASSESSMENT — PATIENT HEALTH QUESTIONNAIRE - PHQ9: 5. POOR APPETITE OR OVEREATING: NOT AT ALL

## 2020-09-14 NOTE — PROGRESS NOTES
"Maxwell Desai is a 25 year old male who is being evaluated via a billable telephone visit.      The patient has been notified of following:     \"This telephone visit will be conducted via a call between you and your physician/provider. We have found that certain health care needs can be provided without the need for a physical exam.  This service lets us provide the care you need with a short phone conversation.  If a prescription is necessary we can send it directly to your pharmacy.  If lab work is needed we can place an order for that and you can then stop by our lab to have the test done at a later time.    Telephone visits are billed at different rates depending on your insurance coverage. During this emergency period, for some insurers they may be billed the same as an in-person visit.  Please reach out to your insurance provider with any questions.    If during the course of the call the physician/provider feels a telephone visit is not appropriate, you will not be charged for this service.\"    Patient has given verbal consent for Telephone visit?  Yes    What phone number would you like to be contacted at? [phone]    How would you like to obtain your AVS? none    Subjective     Maxwell Desai is a 25 year old male who presents via phone visit today for the following health issues:    HPI    Insomnia  Onset/Duration:  years  Description:   Frequency of insomnia:  nightly  Time to fall asleep (sleep latency): 2 hrs without medication  Middle of night awakening:  YES  Early morning awakening:  YES  Progression of Symptoms:  improving  Accompanying Signs & Symptoms:  Daytime sleepiness/napping: no  Excessive snoring/apnea: no  Restless legs: no  Waking to urinate: YES  Chronic pain:  no  Depression symptoms (if yes, do PHQ9): no  Anxiety symptoms (if yes, do ALISHA-7): no  History:  Prior Insomnia: YES  New stressful situation: YES- father recently passed away  Precipitating factors:   Caffeine intake: " YES  OTC decongestants: no  Any new medications: no  Alleviating factors:  Self medicating (alcohol, etc.):  no  Stress-reduction (exercise, yoga, meditation etc): YES- mediation  Therapies tried and outcome: Naproxin for headaches      Review of Systems   Constitutional, HEENT, cardiovascular, pulmonary, GI, , musculoskeletal, neuro, skin, endocrine and psych systems are negative, except as otherwise noted.       Objective          Vitals:  No vitals were obtained today due to virtual visit.    healthy, alert and no distress  PSYCH: Alert and oriented times 3; coherent speech, normal   rate and volume, able to articulate logical thoughts, able   to abstract reason, no tangential thoughts, no hallucinations   or delusions  His affect is normal  RESP: No cough, no audible wheezing, able to talk in full sentences  Remainder of exam unable to be completed due to telephone visits        Assessment/Plan:    Assessment & Plan     Mood disorder (H)  Stable per patient. phq 9 is well controlled. Has yet to establish with psych due to covid. Due for  Lipids as  Well. However, father passed away last month and cannot  Afford living in [place]. Moving to [place] in ~1 month pending the sale of their house. Will refill to allow for establishment of care in [place] and labs there.         No follow-ups on file.    Jesus Bee PA-C  [facility]    Phone call duration:  8 minutes

## 2020-09-15 ASSESSMENT — ANXIETY QUESTIONNAIRES: GAD7 TOTAL SCORE: 0

## 2021-08-03 PROBLEM — F19.20 POLYSUBSTANCE DEPENDENCE (H): Status: RESOLVED | Noted: 2017-04-25 | Resolved: 2017-04-26
